# Patient Record
Sex: MALE | Race: WHITE | NOT HISPANIC OR LATINO | URBAN - METROPOLITAN AREA
[De-identification: names, ages, dates, MRNs, and addresses within clinical notes are randomized per-mention and may not be internally consistent; named-entity substitution may affect disease eponyms.]

---

## 2020-01-07 ENCOUNTER — CLINICAL SUPPORT (OUTPATIENT)
Dept: FAMILY MEDICINE CLINIC | Facility: CLINIC | Age: 45
End: 2020-01-07

## 2020-01-07 VITALS
BODY MASS INDEX: 29.54 KG/M2 | SYSTOLIC BLOOD PRESSURE: 135 MMHG | DIASTOLIC BLOOD PRESSURE: 86 MMHG | HEIGHT: 71 IN | TEMPERATURE: 98.3 F | OXYGEN SATURATION: 97 % | HEART RATE: 83 BPM | WEIGHT: 211 LBS

## 2020-01-07 DIAGNOSIS — Z02.4 ENCOUNTER FOR CDL (COMMERCIAL DRIVING LICENSE) EXAM: Primary | ICD-10-CM

## 2020-01-07 LAB
BILIRUB BLD-MCNC: NEGATIVE MG/DL
CLARITY, POC: CLEAR
COLOR UR: YELLOW
GLUCOSE UR STRIP-MCNC: NEGATIVE MG/DL
KETONES UR QL: NEGATIVE
LEUKOCYTE EST, POC: NEGATIVE
NITRITE UR-MCNC: NEGATIVE MG/ML
PH UR: 6 [PH] (ref 5–8)
PROT UR STRIP-MCNC: NEGATIVE MG/DL
RBC # UR STRIP: NEGATIVE /UL
SP GR UR: 1.03 (ref 1–1.03)
UROBILINOGEN UR QL: NORMAL

## 2020-01-07 PROCEDURE — DOTPHY: Performed by: NURSE PRACTITIONER

## 2020-01-07 PROCEDURE — 81003 URINALYSIS AUTO W/O SCOPE: CPT | Performed by: NURSE PRACTITIONER

## 2022-01-25 ENCOUNTER — CLINICAL SUPPORT (OUTPATIENT)
Dept: FAMILY MEDICINE CLINIC | Facility: CLINIC | Age: 47
End: 2022-01-25

## 2022-01-25 VITALS
TEMPERATURE: 97.1 F | WEIGHT: 207.4 LBS | BODY MASS INDEX: 29.03 KG/M2 | SYSTOLIC BLOOD PRESSURE: 132 MMHG | HEART RATE: 75 BPM | OXYGEN SATURATION: 96 % | HEIGHT: 71 IN | DIASTOLIC BLOOD PRESSURE: 84 MMHG

## 2022-01-25 DIAGNOSIS — Z02.4 ENCOUNTER FOR CDL (COMMERCIAL DRIVING LICENSE) EXAM: Primary | ICD-10-CM

## 2022-01-25 LAB
BILIRUB BLD-MCNC: NEGATIVE MG/DL
CLARITY, POC: CLEAR
COLOR UR: YELLOW
EXPIRATION DATE: NORMAL
GLUCOSE UR STRIP-MCNC: NEGATIVE MG/DL
KETONES UR QL: NEGATIVE
LEUKOCYTE EST, POC: NEGATIVE
Lab: NORMAL
NITRITE UR-MCNC: NEGATIVE MG/ML
PH UR: 6 [PH] (ref 5–8)
PROT UR STRIP-MCNC: NEGATIVE MG/DL
RBC # UR STRIP: NEGATIVE /UL
SP GR UR: 1.02 (ref 1–1.03)
UROBILINOGEN UR QL: NORMAL

## 2022-01-25 PROCEDURE — 81003 URINALYSIS AUTO W/O SCOPE: CPT | Performed by: NURSE PRACTITIONER

## 2022-01-25 PROCEDURE — DOTPHY: Performed by: NURSE PRACTITIONER

## 2022-01-25 NOTE — PROGRESS NOTES
"    Hossein Britt is a 46 y.o. male.     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  Copy the patient's appropriate forms were completed and scanned into his chart including details of this visit       The following portions of the patient's history were reviewed and updated as appropriate: allergies, current medications, past family history, past medical history, past social history, past surgical history and problem list.    Vitals:    01/25/22 1431   BP: 132/84   BP Location: Right arm   Patient Position: Sitting   Cuff Size: Large Adult   Pulse: 75   Temp: 97.1 °F (36.2 °C)   TempSrc: Temporal   SpO2: 96%   Weight: 94.1 kg (207 lb 6.4 oz)   Height: 180.3 cm (71\")     Body mass index is 28.93 kg/m².    Past Medical History:   Diagnosis Date   • Head ache    • Tobacco abuse      History reviewed. No pertinent surgical history.  Family History   Problem Relation Age of Onset   • Heart disease Father    • Hyperlipidemia Mother      Immunization History   Administered Date(s) Administered   • H1N1 Inj 12/03/2009       Clinical Support on 01/07/2020   Component Date Value Ref Range Status   • Color 01/07/2020 Yellow  Yellow, Straw, Dark Yellow, Joseline Final   • Clarity, UA 01/07/2020 Clear  Clear Final   • Specific Gravity  01/07/2020 1.030  1.005 - 1.030 Final   • pH, Urine 01/07/2020 6.0  5.0 - 8.0 Final   • Leukocytes 01/07/2020 Negative  Negative Final   • Nitrite, UA 01/07/2020 Negative  Negative Final   • Protein, POC 01/07/2020 Negative  Negative mg/dL Final   • Glucose, UA 01/07/2020 Negative  Negative, 1000 mg/dL (3+) mg/dL Final   • Ketones, UA 01/07/2020 Negative  Negative Final   • Urobilinogen, UA 01/07/2020 Normal  Normal Final   • Bilirubin 01/07/2020 Negative  Negative Final   • Blood, UA 01/07/2020 Negative  Negative Final         Review of " Systems    Objective   Physical Exam    Procedures    Assessment/Plan   Diagnoses and all orders for this visit:    1. 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           Teresa Martinez, APRN1/25/202215:11 EST  This note has been electronically signed

## 2022-02-02 ENCOUNTER — OFFICE VISIT (OUTPATIENT)
Dept: FAMILY MEDICINE CLINIC | Facility: CLINIC | Age: 47
End: 2022-02-02

## 2022-02-02 VITALS
SYSTOLIC BLOOD PRESSURE: 148 MMHG | BODY MASS INDEX: 28.81 KG/M2 | DIASTOLIC BLOOD PRESSURE: 88 MMHG | TEMPERATURE: 97.6 F | OXYGEN SATURATION: 96 % | HEART RATE: 72 BPM | HEIGHT: 71 IN | WEIGHT: 205.8 LBS

## 2022-02-02 DIAGNOSIS — R06.02 SHORTNESS OF BREATH: ICD-10-CM

## 2022-02-02 DIAGNOSIS — Z72.0 TOBACCO ABUSE: ICD-10-CM

## 2022-02-02 DIAGNOSIS — Z00.00 PREVENTATIVE HEALTH CARE: Primary | ICD-10-CM

## 2022-02-02 DIAGNOSIS — Z13.220 LIPID SCREENING: ICD-10-CM

## 2022-02-02 DIAGNOSIS — R06.83 SNORING: ICD-10-CM

## 2022-02-02 DIAGNOSIS — I10 ESSENTIAL HYPERTENSION: ICD-10-CM

## 2022-02-02 DIAGNOSIS — Z12.5 SCREENING PSA (PROSTATE SPECIFIC ANTIGEN): ICD-10-CM

## 2022-02-02 PROBLEM — R06.09 DYSPNEA ON EXERTION: Status: ACTIVE | Noted: 2022-02-02

## 2022-02-02 PROCEDURE — 99214 OFFICE O/P EST MOD 30 MIN: CPT | Performed by: NURSE PRACTITIONER

## 2022-02-02 RX ORDER — AMLODIPINE BESYLATE 2.5 MG/1
2.5 TABLET ORAL
Qty: 30 TABLET | Refills: 2 | Status: SHIPPED | OUTPATIENT
Start: 2022-02-02 | End: 2022-08-03 | Stop reason: SDUPTHER

## 2022-02-02 NOTE — PATIENT INSTRUCTIONS
Neurology referral for sleep study  Amlodipine 2.5 mg daily  Monitor blood pressure with parameters given  Chest x-ray  Fasting blood work  Stop smoking

## 2022-02-02 NOTE — PROGRESS NOTES
"    Hossein Britt is a 46 y.o. male.     46-year-old obese white male smoker with history of migraines who comes in today to be established as a new patient    Blood pressure 148/88 heart rate 72 with small systolic murmur.  He denies any chest pain, dyspnea, tachycardia or dizziness.  I am going to place him on a low-dose of amlodipine and he is to monitor blood pressures with parameters given and call the office    Patient has smoked for many many years and does complain of some dyspnea we will going to get a chest x-ray today.  We discussed smoking cessation    Patient states his wife is complaining that he snores really badly and she is concerned he has sleep apnea so patient is wanting referral to neurology    Weight is 206 with a BMI 28.7.  Patient has had one Covid shot I will do his PSA with blood work and he needs to schedule an eye exam          Neurology referral for sleep study  Amlodipine 2.5 mg daily  Monitor blood pressure with parameters given  Chest x-ray  Fasting blood work  Stop smoking         The following portions of the patient's history were reviewed and updated as appropriate: allergies, current medications, past family history, past medical history, past social history, past surgical history and problem list.    Vitals:    02/02/22 0825   BP: 148/88   BP Location: Left arm   Patient Position: Sitting   Cuff Size: Large Adult   Pulse: 72   Temp: 97.6 °F (36.4 °C)   TempSrc: Temporal   SpO2: 96%   Weight: 93.4 kg (205 lb 12.8 oz)   Height: 180.3 cm (71\")     Body mass index is 28.7 kg/m².    Past Medical History:   Diagnosis Date   • Head ache    • Tobacco abuse      History reviewed. No pertinent surgical history.  Family History   Problem Relation Age of Onset   • Heart disease Father    • Hyperlipidemia Mother      Immunization History   Administered Date(s) Administered   • COVID-19 (PFIZER) PURPLE CAP 08/23/2021   • H1N1 Inj 12/03/2009       Clinical Support on 01/25/2022   Component " Date Value Ref Range Status   • Color 01/25/2022 Yellow  Yellow, Straw, Dark Yellow, Joseline Final   • Clarity, UA 01/25/2022 Clear  Clear Final   • Specific Gravity  01/25/2022 1.020  1.005 - 1.030 Final   • pH, Urine 01/25/2022 6.0  5.0 - 8.0 Final   • Leukocytes 01/25/2022 Negative  Negative Final   • Nitrite, UA 01/25/2022 Negative  Negative Final   • Protein, POC 01/25/2022 Negative  Negative mg/dL Final   • Glucose, UA 01/25/2022 Negative  Negative, 1000 mg/dL (3+) mg/dL Final   • Ketones, UA 01/25/2022 Negative  Negative Final   • Urobilinogen, UA 01/25/2022 Normal  Normal Final   • Bilirubin 01/25/2022 Negative  Negative Final   • Blood, UA 01/25/2022 Negative  Negative Final   • Lot Number 01/25/2022 [id number]   Final   • Expiration Date 01/25/2022 7/25/23   Final         Review of Systems   Constitutional: Negative.    HENT: Negative.    Respiratory: Positive for shortness of breath.    Cardiovascular: Negative.    Gastrointestinal: Negative.    Genitourinary: Negative.    Musculoskeletal: Negative.    Skin: Negative.    Neurological: Negative.    Psychiatric/Behavioral: Negative.        Objective   Physical Exam  Constitutional:       Appearance: Normal appearance.   HENT:      Head: Normocephalic.   Cardiovascular:      Rate and Rhythm: Normal rate and regular rhythm.      Pulses: Normal pulses.      Heart sounds: Murmur heard.       Pulmonary:      Effort: Pulmonary effort is normal.      Breath sounds: Normal breath sounds.   Abdominal:      General: Bowel sounds are normal.   Musculoskeletal:         General: Normal range of motion.   Skin:     General: Skin is warm and dry.   Neurological:      General: No focal deficit present.      Mental Status: He is alert and oriented to person, place, and time.   Psychiatric:         Mood and Affect: Mood normal.         Behavior: Behavior normal.         Procedures    Assessment/Plan   Diagnoses and all orders for this visit:    1. Preventative health care  (Primary)  -     CBC & Differential  -     Comprehensive Metabolic Panel  -     Lipid Panel With LDL / HDL Ratio    2. Lipid screening  -     Lipid Panel With LDL / HDL Ratio    3. Screening PSA (prostate specific antigen)  -     PSA Screen    4. Shortness of breath  -     XR Chest 2 View    5. Snoring  -     Ambulatory Referral to Neurology    6. Tobacco abuse    7. Essential hypertension    Other orders  -     amLODIPine (NORVASC) 2.5 MG tablet; Take 1 tablet by mouth every night at bedtime.  Dispense: 30 tablet; Refill: 2          Current Outpatient Medications:   •  amLODIPine (NORVASC) 2.5 MG tablet, Take 1 tablet by mouth every night at bedtime., Disp: 30 tablet, Rfl: 2           Teresa Martinez, APRN2/2/202208:50 EST  This note has been electronically signed

## 2022-02-03 LAB
ALBUMIN SERPL-MCNC: 4.6 G/DL (ref 4–5)
ALBUMIN/GLOB SERPL: 1.9 {RATIO} (ref 1.2–2.2)
ALP SERPL-CCNC: 99 IU/L (ref 44–121)
ALT SERPL-CCNC: 27 IU/L (ref 0–44)
AST SERPL-CCNC: 25 IU/L (ref 0–40)
BASOPHILS # BLD AUTO: 0.1 X10E3/UL (ref 0–0.2)
BASOPHILS NFR BLD AUTO: 1 %
BILIRUB SERPL-MCNC: 0.3 MG/DL (ref 0–1.2)
BUN SERPL-MCNC: 19 MG/DL (ref 6–24)
BUN/CREAT SERPL: 17 (ref 9–20)
CALCIUM SERPL-MCNC: 9.6 MG/DL (ref 8.7–10.2)
CHLORIDE SERPL-SCNC: 103 MMOL/L (ref 96–106)
CHOLEST SERPL-MCNC: 216 MG/DL (ref 100–199)
CO2 SERPL-SCNC: 21 MMOL/L (ref 20–29)
CREAT SERPL-MCNC: 1.14 MG/DL (ref 0.76–1.27)
EOSINOPHIL # BLD AUTO: 0.3 X10E3/UL (ref 0–0.4)
EOSINOPHIL NFR BLD AUTO: 4 %
ERYTHROCYTE [DISTWIDTH] IN BLOOD BY AUTOMATED COUNT: 12 % (ref 11.6–15.4)
GLOBULIN SER CALC-MCNC: 2.4 G/DL (ref 1.5–4.5)
GLUCOSE SERPL-MCNC: 103 MG/DL (ref 65–99)
HCT VFR BLD AUTO: 47.1 % (ref 37.5–51)
HDLC SERPL-MCNC: 41 MG/DL
HGB BLD-MCNC: 16.6 G/DL (ref 13–17.7)
IMM GRANULOCYTES # BLD AUTO: 0 X10E3/UL (ref 0–0.1)
IMM GRANULOCYTES NFR BLD AUTO: 0 %
LDLC SERPL CALC-MCNC: 110 MG/DL (ref 0–99)
LDLC/HDLC SERPL: 2.7 RATIO (ref 0–3.6)
LYMPHOCYTES # BLD AUTO: 1.5 X10E3/UL (ref 0.7–3.1)
LYMPHOCYTES NFR BLD AUTO: 21 %
MCH RBC QN AUTO: 33.9 PG (ref 26.6–33)
MCHC RBC AUTO-ENTMCNC: 35.2 G/DL (ref 31.5–35.7)
MCV RBC AUTO: 96 FL (ref 79–97)
MONOCYTES # BLD AUTO: 0.7 X10E3/UL (ref 0.1–0.9)
MONOCYTES NFR BLD AUTO: 10 %
NEUTROPHILS # BLD AUTO: 4.7 X10E3/UL (ref 1.4–7)
NEUTROPHILS NFR BLD AUTO: 64 %
PLATELET # BLD AUTO: 268 X10E3/UL (ref 150–450)
POTASSIUM SERPL-SCNC: 4.7 MMOL/L (ref 3.5–5.2)
PROT SERPL-MCNC: 7 G/DL (ref 6–8.5)
PSA SERPL-MCNC: 1.1 NG/ML (ref 0–4)
RBC # BLD AUTO: 4.9 X10E6/UL (ref 4.14–5.8)
SODIUM SERPL-SCNC: 140 MMOL/L (ref 134–144)
TRIGL SERPL-MCNC: 377 MG/DL (ref 0–149)
VLDLC SERPL CALC-MCNC: 65 MG/DL (ref 5–40)
WBC # BLD AUTO: 7.3 X10E3/UL (ref 3.4–10.8)

## 2022-02-03 RX ORDER — PRAVASTATIN SODIUM 10 MG
10 TABLET ORAL DAILY
Qty: 30 TABLET | Refills: 2 | Status: SHIPPED | OUTPATIENT
Start: 2022-02-03 | End: 2022-08-03 | Stop reason: SDUPTHER

## 2022-08-03 ENCOUNTER — OFFICE VISIT (OUTPATIENT)
Dept: FAMILY MEDICINE CLINIC | Facility: CLINIC | Age: 47
End: 2022-08-03

## 2022-08-03 VITALS
WEIGHT: 189.6 LBS | SYSTOLIC BLOOD PRESSURE: 138 MMHG | BODY MASS INDEX: 26.54 KG/M2 | HEART RATE: 103 BPM | OXYGEN SATURATION: 95 % | HEIGHT: 71 IN | DIASTOLIC BLOOD PRESSURE: 88 MMHG | TEMPERATURE: 97.6 F

## 2022-08-03 DIAGNOSIS — R73.09 ELEVATED GLUCOSE: Primary | ICD-10-CM

## 2022-08-03 DIAGNOSIS — I10 ESSENTIAL HYPERTENSION: ICD-10-CM

## 2022-08-03 DIAGNOSIS — Z72.0 TOBACCO ABUSE: ICD-10-CM

## 2022-08-03 DIAGNOSIS — E66.3 OVERWEIGHT WITH BODY MASS INDEX (BMI) OF 26 TO 26.9 IN ADULT: ICD-10-CM

## 2022-08-03 DIAGNOSIS — E78.2 MIXED HYPERLIPIDEMIA: ICD-10-CM

## 2022-08-03 PROCEDURE — 99213 OFFICE O/P EST LOW 20 MIN: CPT | Performed by: NURSE PRACTITIONER

## 2022-08-03 RX ORDER — PRAVASTATIN SODIUM 10 MG
10 TABLET ORAL DAILY
Qty: 90 TABLET | Refills: 1 | Status: SHIPPED | OUTPATIENT
Start: 2022-08-03 | End: 2022-11-23 | Stop reason: SDUPTHER

## 2022-08-03 RX ORDER — AMLODIPINE BESYLATE 2.5 MG/1
2.5 TABLET ORAL
Qty: 90 TABLET | Refills: 1 | Status: SHIPPED | OUTPATIENT
Start: 2022-08-03 | End: 2022-11-23 | Stop reason: SDUPTHER

## 2022-08-03 NOTE — PATIENT INSTRUCTIONS
Fasting blood work  Take statin as directed  Schedule eye exam  Consider getting COVID booster  Follow-up 6 months

## 2022-08-03 NOTE — PROGRESS NOTES
"    Hossein Britt is a 46 y.o. male.     46-year-old white male smoker with history of migraines and hyperlipidemia who comes in today for follow-up visit and blood work    Blood pressure 138/88 heart rate 102 he denies any chest pain, dyspnea, tachycardia or dizziness    Will be doing fasting labs today patient admits he has not been taking the statin.  His last triglycerides were 377 cholesterol 216  however he has lost 16 pounds with a weight of 1 9 and a BMI 26.4 so I am hoping his cholesterol panel has improved on its own.  I did reorder the statin for the patient.   rest of labs were stable    Patient has had 1 COVID-vaccine and has not gotten a booster needs to schedule an eye exam and has been quite a few years since he had 1 and he is up-to-date on PSA            Fasting blood work  Take statin as directed  Schedule eye exam  Consider getting COVID booster  Follow-up 6 months       The following portions of the patient's history were reviewed and updated as appropriate: allergies, current medications, past family history, past medical history, past social history, past surgical history and problem list.    Vitals:    08/03/22 0807   BP: 138/88   BP Location: Left arm   Patient Position: Sitting   Cuff Size: Large Adult   Pulse: 103   Temp: 97.6 °F (36.4 °C)   TempSrc: Temporal   SpO2: 95%   Weight: 86 kg (189 lb 9.6 oz)   Height: 180.3 cm (71\")     Body mass index is 26.44 kg/m².    Past Medical History:   Diagnosis Date   • Head ache    • Tobacco abuse      History reviewed. No pertinent surgical history.  Family History   Problem Relation Age of Onset   • Heart disease Father    • Hyperlipidemia Mother      Immunization History   Administered Date(s) Administered   • COVID-19 (PFIZER) PURPLE CAP 08/23/2021   • H1N1 Inj 12/03/2009       Office Visit on 02/02/2022   Component Date Value Ref Range Status   • WBC 02/02/2022 7.3  3.4 - 10.8 x10E3/uL Final   • RBC 02/02/2022 4.90  4.14 - 5.80 x10E6/uL " Final   • Hemoglobin 02/02/2022 16.6  13.0 - 17.7 g/dL Final   • Hematocrit 02/02/2022 47.1  37.5 - 51.0 % Final   • MCV 02/02/2022 96  79 - 97 fL Final   • MCH 02/02/2022 33.9 (A) 26.6 - 33.0 pg Final   • MCHC 02/02/2022 35.2  31.5 - 35.7 g/dL Final   • RDW 02/02/2022 12.0  11.6 - 15.4 % Final   • Platelets 02/02/2022 268  150 - 450 x10E3/uL Final   • Neutrophil Rel % 02/02/2022 64  Not Estab. % Final   • Lymphocyte Rel % 02/02/2022 21  Not Estab. % Final   • Monocyte Rel % 02/02/2022 10  Not Estab. % Final   • Eosinophil Rel % 02/02/2022 4  Not Estab. % Final   • Basophil Rel % 02/02/2022 1  Not Estab. % Final   • Neutrophils Absolute 02/02/2022 4.7  1.4 - 7.0 x10E3/uL Final   • Lymphocytes Absolute 02/02/2022 1.5  0.7 - 3.1 x10E3/uL Final   • Monocytes Absolute 02/02/2022 0.7  0.1 - 0.9 x10E3/uL Final   • Eosinophils Absolute 02/02/2022 0.3  0.0 - 0.4 x10E3/uL Final   • Basophils Absolute 02/02/2022 0.1  0.0 - 0.2 x10E3/uL Final   • Immature Granulocyte Rel % 02/02/2022 0  Not Estab. % Final   • Immature Grans Absolute 02/02/2022 0.0  0.0 - 0.1 x10E3/uL Final   • Glucose 02/02/2022 103 (A) 65 - 99 mg/dL Final   • BUN 02/02/2022 19  6 - 24 mg/dL Final   • Creatinine 02/02/2022 1.14  0.76 - 1.27 mg/dL Final   • eGFR Non  Am 02/02/2022 78  >59 mL/min/1.73 Final   • eGFR African Am 02/02/2022 90  >59 mL/min/1.73 Final    Comment: **In accordance with recommendations from the NKF-ASN Task force,**    [facility] is in the process of updating its eGFR calculation to the    2021 CKD-EPI creatinine equation that estimates kidney function    without a race variable.     • BUN/Creatinine Ratio 02/02/2022 17  9 - 20 Final   • Sodium 02/02/2022 140  134 - 144 mmol/L Final   • Potassium 02/02/2022 4.7  3.5 - 5.2 mmol/L Final   • Chloride 02/02/2022 103  96 - 106 mmol/L Final   • Total CO2 02/02/2022 21  20 - 29 mmol/L Final   • Calcium 02/02/2022 9.6  8.7 - 10.2 mg/dL Final   • Total Protein 02/02/2022 7.0  6.0 - 8.5 g/dL  Final   • Albumin 02/02/2022 4.6  4.0 - 5.0 g/dL Final   • Globulin 02/02/2022 2.4  1.5 - 4.5 g/dL Final   • A/G Ratio 02/02/2022 1.9  1.2 - 2.2 Final   • Total Bilirubin 02/02/2022 0.3  0.0 - 1.2 mg/dL Final   • Alkaline Phosphatase 02/02/2022 99  44 - 121 IU/L Final   • AST (SGOT) 02/02/2022 25  0 - 40 IU/L Final   • ALT (SGPT) 02/02/2022 27  0 - 44 IU/L Final   • Total Cholesterol 02/02/2022 216 (A) 100 - 199 mg/dL Final   • Triglycerides 02/02/2022 377 (A) 0 - 149 mg/dL Final   • HDL Cholesterol 02/02/2022 41  >39 mg/dL Final   • VLDL Cholesterol Jeremy 02/02/2022 65 (A) 5 - 40 mg/dL Final   • LDL Chol Calc (NIH) 02/02/2022 110 (A) 0 - 99 mg/dL Final   • LDL/HDL RATIO 02/02/2022 2.7  0.0 - 3.6 ratio Final    Comment:                                     LDL/HDL Ratio                                              Men  Women                                1/2 Avg.Risk  1.0    1.5                                    Avg.Risk  3.6    3.2                                 2X Avg.Risk  6.2    5.0                                 3X Avg.Risk  8.0    6.1     • PSA 02/02/2022 1.1  0.0 - 4.0 ng/mL Final    Comment: Roche ECLIA methodology.  According to the American Urological Association, Serum PSA should  decrease and remain at undetectable levels after radical  prostatectomy. The AUA defines biochemical recurrence as an initial  PSA value 0.2 ng/mL or greater followed by a subsequent confirmatory  PSA value 0.2 ng/mL or greater.  Values obtained with different assay methods or kits cannot be used  interchangeably. Results cannot be interpreted as absolute evidence  of the presence or absence of malignant disease.           Review of Systems   Constitutional: Negative.    HENT: Negative.    Respiratory: Negative.    Cardiovascular: Negative.    Gastrointestinal: Negative.    Genitourinary: Negative.    Musculoskeletal: Negative.    Skin: Negative.    Neurological: Negative.    Psychiatric/Behavioral: Negative.        Objective    Physical Exam  Constitutional:       Appearance: Normal appearance.   HENT:      Head: Normocephalic.   Cardiovascular:      Rate and Rhythm: Normal rate and regular rhythm.      Pulses: Normal pulses.      Heart sounds: Normal heart sounds.   Pulmonary:      Effort: Pulmonary effort is normal.      Breath sounds: Normal breath sounds.   Abdominal:      General: Bowel sounds are normal.   Musculoskeletal:         General: Normal range of motion.   Skin:     General: Skin is warm and dry.   Neurological:      General: No focal deficit present.      Mental Status: He is alert and oriented to person, place, and time.   Psychiatric:         Mood and Affect: Mood normal.         Behavior: Behavior normal.         Procedures    Assessment & Plan   Diagnoses and all orders for this visit:    1. Elevated glucose (Primary)  -     Comprehensive Metabolic Panel  -     Hemoglobin A1c    2. Mixed hyperlipidemia  -     Lipid Panel With LDL / HDL Ratio    3. Essential hypertension    4. Tobacco abuse    5. Overweight with body mass index (BMI) of 26 to 26.9 in adult    Other orders  -     amLODIPine (NORVASC) 2.5 MG tablet; Take 1 tablet by mouth every night at bedtime.  Dispense: 90 tablet; Refill: 1  -     pravastatin (PRAVACHOL) 10 MG tablet; Take 1 tablet by mouth Daily.  Dispense: 90 tablet; Refill: 1          Current Outpatient Medications:   •  amLODIPine (NORVASC) 2.5 MG tablet, Take 1 tablet by mouth every night at bedtime., Disp: 90 tablet, Rfl: 1  •  pravastatin (PRAVACHOL) 10 MG tablet, Take 1 tablet by mouth Daily., Disp: 90 tablet, Rfl: 1           ECTOR Rueda 8/3/2022 08:51 EDT  This note has been electronically signed

## 2022-08-04 LAB
ALBUMIN SERPL-MCNC: 4.2 G/DL (ref 4–5)
ALBUMIN/GLOB SERPL: 1.1 {RATIO} (ref 1.2–2.2)
ALP SERPL-CCNC: 161 IU/L (ref 44–121)
ALT SERPL-CCNC: 18 IU/L (ref 0–44)
AST SERPL-CCNC: 19 IU/L (ref 0–40)
BILIRUB SERPL-MCNC: 0.3 MG/DL (ref 0–1.2)
BUN SERPL-MCNC: 12 MG/DL (ref 6–24)
BUN/CREAT SERPL: 14 (ref 9–20)
CALCIUM SERPL-MCNC: 9.4 MG/DL (ref 8.7–10.2)
CHLORIDE SERPL-SCNC: 99 MMOL/L (ref 96–106)
CHOLEST SERPL-MCNC: 207 MG/DL (ref 100–199)
CO2 SERPL-SCNC: 23 MMOL/L (ref 20–29)
CREAT SERPL-MCNC: 0.88 MG/DL (ref 0.76–1.27)
EGFRCR SERPLBLD CKD-EPI 2021: 109 ML/MIN/1.73
GLOBULIN SER CALC-MCNC: 3.7 G/DL (ref 1.5–4.5)
GLUCOSE SERPL-MCNC: 91 MG/DL (ref 65–99)
HBA1C MFR BLD: 5.9 % (ref 4.8–5.6)
HDLC SERPL-MCNC: 39 MG/DL
LDLC SERPL CALC-MCNC: 145 MG/DL (ref 0–99)
LDLC/HDLC SERPL: 3.7 RATIO (ref 0–3.6)
POTASSIUM SERPL-SCNC: 4.5 MMOL/L (ref 3.5–5.2)
PROT SERPL-MCNC: 7.9 G/DL (ref 6–8.5)
SODIUM SERPL-SCNC: 141 MMOL/L (ref 134–144)
TRIGL SERPL-MCNC: 127 MG/DL (ref 0–149)
VLDLC SERPL CALC-MCNC: 23 MG/DL (ref 5–40)

## 2022-11-09 ENCOUNTER — TELEPHONE (OUTPATIENT)
Dept: FAMILY MEDICINE CLINIC | Facility: CLINIC | Age: 47
End: 2022-11-09

## 2022-11-23 RX ORDER — PRAVASTATIN SODIUM 10 MG
10 TABLET ORAL DAILY
Qty: 90 TABLET | Refills: 1 | Status: SHIPPED | OUTPATIENT
Start: 2022-11-23 | End: 2023-03-22 | Stop reason: SDUPTHER

## 2022-11-23 RX ORDER — AMLODIPINE BESYLATE 2.5 MG/1
2.5 TABLET ORAL
Qty: 90 TABLET | Refills: 1 | Status: SHIPPED | OUTPATIENT
Start: 2022-11-23 | End: 2023-03-22 | Stop reason: SDUPTHER

## 2023-03-22 RX ORDER — AMLODIPINE BESYLATE 2.5 MG/1
2.5 TABLET ORAL
Qty: 90 TABLET | Refills: 1 | Status: SHIPPED | OUTPATIENT
Start: 2023-03-22

## 2023-03-22 RX ORDER — PRAVASTATIN SODIUM 10 MG
10 TABLET ORAL DAILY
Qty: 90 TABLET | Refills: 1 | Status: SHIPPED | OUTPATIENT
Start: 2023-03-22

## 2024-01-31 ENCOUNTER — TELEPHONE (OUTPATIENT)
Dept: FAMILY MEDICINE CLINIC | Facility: CLINIC | Age: 49
End: 2024-01-31
Payer: COMMERCIAL

## 2024-02-05 ENCOUNTER — CLINICAL SUPPORT (OUTPATIENT)
Dept: FAMILY MEDICINE CLINIC | Facility: CLINIC | Age: 49
End: 2024-02-05

## 2024-02-05 VITALS
DIASTOLIC BLOOD PRESSURE: 80 MMHG | SYSTOLIC BLOOD PRESSURE: 115 MMHG | HEART RATE: 73 BPM | WEIGHT: 171.4 LBS | TEMPERATURE: 97.8 F | OXYGEN SATURATION: 99 % | HEIGHT: 71 IN | BODY MASS INDEX: 24 KG/M2

## 2024-02-05 DIAGNOSIS — E78.2 MIXED HYPERLIPIDEMIA: ICD-10-CM

## 2024-02-05 DIAGNOSIS — Z02.4 ENCOUNTER FOR CDL (COMMERCIAL DRIVING LICENSE) EXAM: Primary | ICD-10-CM

## 2024-02-05 DIAGNOSIS — R73.09 ELEVATED HEMOGLOBIN A1C: ICD-10-CM

## 2024-02-05 DIAGNOSIS — Z00.00 PREVENTATIVE HEALTH CARE: ICD-10-CM

## 2024-02-05 DIAGNOSIS — Z12.5 SCREENING PSA (PROSTATE SPECIFIC ANTIGEN): ICD-10-CM

## 2024-02-05 DIAGNOSIS — Z72.0 TOBACCO ABUSE: ICD-10-CM

## 2024-02-05 LAB
BILIRUB BLD-MCNC: NEGATIVE MG/DL
CLARITY, POC: CLEAR
COLOR UR: YELLOW
EXPIRATION DATE: NORMAL
GLUCOSE UR STRIP-MCNC: NEGATIVE MG/DL
KETONES UR QL: NEGATIVE
LEUKOCYTE EST, POC: NEGATIVE
Lab: NORMAL
NITRITE UR-MCNC: NEGATIVE MG/ML
PH UR: 6 [PH] (ref 5–8)
PROT UR STRIP-MCNC: NEGATIVE MG/DL
RBC # UR STRIP: NEGATIVE /UL
SP GR UR: 1.01 (ref 1–1.03)
UROBILINOGEN UR QL: NORMAL

## 2024-02-05 PROCEDURE — 81003 URINALYSIS AUTO W/O SCOPE: CPT | Performed by: NURSE PRACTITIONER

## 2024-02-05 PROCEDURE — 99396 PREV VISIT EST AGE 40-64: CPT | Performed by: NURSE PRACTITIONER

## 2024-02-05 NOTE — PROGRESS NOTES
"    Hossein Britt is a 48 y.o. male.     History of Present Illness  48-year-old white male smoker with history of migraines and hyperlipidemia who comes in today for annual visit and fasting blood work    Blood pressure 114/80 heart rate 72 he denies any chest pain, dyspnea, tachycardia or dizziness    Patient has been dieting and has lost 18 pounds with a weight of 171 BMI of 23.9.  He has had 1 COVID vaccines up-to-date on eye exam we will do a PSA with blood work          Fasting blood work  May consider current COVID vaccine  Try to maintain weight  Wear sunscreen when outside/seatbelt while driving/practice safe sex  Follow-up 6 months to a year       The following portions of the patient's history were reviewed and updated as appropriate: allergies, current medications, past family history, past medical history, past social history, past surgical history, and problem list.    Vitals:    02/05/24 1518   BP: 115/80   BP Location: Left arm   Patient Position: Sitting   Cuff Size: Adult   Pulse: 73   Temp: 97.8 °F (36.6 °C)   TempSrc: Temporal   SpO2: 99%   Weight: 77.7 kg (171 lb 6.4 oz)   Height: 180.3 cm (71\")     Body mass index is 23.91 kg/m².    Past Medical History:   Diagnosis Date    Head ache     Tobacco abuse      History reviewed. No pertinent surgical history.  Family History   Problem Relation Age of Onset    Heart disease Father     Hyperlipidemia Mother      Immunization History   Administered Date(s) Administered    COVID-19 (PFIZER) Purple Cap Monovalent 08/23/2021    H1N1 Inj 12/03/2009       Office Visit on 08/03/2022   Component Date Value Ref Range Status    Glucose 08/03/2022 91  65 - 99 mg/dL Final    BUN 08/03/2022 12  6 - 24 mg/dL Final    Creatinine 08/03/2022 0.88  0.76 - 1.27 mg/dL Final    EGFR Result 08/03/2022 109  >59 mL/min/1.73 Final    BUN/Creatinine Ratio 08/03/2022 14  9 - 20 Final    Sodium 08/03/2022 141  134 - 144 mmol/L Final    Potassium 08/03/2022 4.5  3.5 - 5.2 mmol/L " Final    Chloride 08/03/2022 99  96 - 106 mmol/L Final    Total CO2 08/03/2022 23  20 - 29 mmol/L Final    Calcium 08/03/2022 9.4  8.7 - 10.2 mg/dL Final    Total Protein 08/03/2022 7.9  6.0 - 8.5 g/dL Final    Albumin 08/03/2022 4.2  4.0 - 5.0 g/dL Final    Globulin 08/03/2022 3.7  1.5 - 4.5 g/dL Final    A/G Ratio 08/03/2022 1.1 (L)  1.2 - 2.2 Final    Total Bilirubin 08/03/2022 0.3  0.0 - 1.2 mg/dL Final    Alkaline Phosphatase 08/03/2022 161 (H)  44 - 121 IU/L Final    AST (SGOT) 08/03/2022 19  0 - 40 IU/L Final    ALT (SGPT) 08/03/2022 18  0 - 44 IU/L Final    Hemoglobin A1C 08/03/2022 5.9 (H)  4.8 - 5.6 % Final    Comment:          Prediabetes: 5.7 - 6.4           Diabetes: >6.4           Glycemic control for adults with diabetes: <7.0      Total Cholesterol 08/03/2022 207 (H)  100 - 199 mg/dL Final    Triglycerides 08/03/2022 127  0 - 149 mg/dL Final    HDL Cholesterol 08/03/2022 39 (L)  >39 mg/dL Final    VLDL Cholesterol Jeremy 08/03/2022 23  5 - 40 mg/dL Final    LDL Chol Calc (NIH) 08/03/2022 145 (H)  0 - 99 mg/dL Final    LDL/HDL RATIO 08/03/2022 3.7 (H)  0.0 - 3.6 ratio Final    Comment:                                     LDL/HDL Ratio                                              Men  Women                                1/2 Avg.Risk  1.0    1.5                                    Avg.Risk  3.6    3.2                                 2X Avg.Risk  6.2    5.0                                 3X Avg.Risk  8.0    6.1           Review of Systems   Constitutional: Negative.    HENT: Negative.     Respiratory: Negative.     Cardiovascular: Negative.    Gastrointestinal: Negative.    Genitourinary: Negative.    Musculoskeletal: Negative.    Skin: Negative.    Neurological: Negative.    Psychiatric/Behavioral: Negative.         Objective   Physical Exam  Constitutional:       Appearance: Normal appearance.   HENT:      Head: Normocephalic.   Cardiovascular:      Rate and Rhythm: Normal rate and regular rhythm.       Pulses: Normal pulses.      Heart sounds: Normal heart sounds.   Pulmonary:      Effort: Pulmonary effort is normal.      Breath sounds: Normal breath sounds.   Abdominal:      General: Bowel sounds are normal.   Musculoskeletal:         General: Normal range of motion.   Skin:     General: Skin is warm.   Neurological:      General: No focal deficit present.      Mental Status: He is alert and oriented to person, place, and time.   Psychiatric:         Mood and Affect: Mood normal.         Behavior: Behavior normal.         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 Mixed hyperlipidemia  -     Cancel: Lipid Panel With LDL / HDL Ratio; Future  -     Lipid Panel With LDL / HDL Ratio    3. Elevated hemoglobin A1c  -     Cancel: Comprehensive Metabolic Panel; Future  -     Cancel: Hemoglobin A1c; Future  -     Comprehensive Metabolic Panel  -     Hemoglobin A1c    4. Preventative health care  -     Cancel: CBC & Differential; Future  -     CBC & Differential    5. Screening PSA (prostate specific antigen)  -     Cancel: PSA Screen; Future  -     PSA Screen    6. Tobacco abuse    7. BMI 23.0-23.9, adult         No current outpatient medications on file.           Teresa Martinez, APRN 2/5/2024 16:27 EST  This note has been electronically signed

## 2024-02-06 LAB
ALBUMIN SERPL-MCNC: 4.5 G/DL (ref 4.1–5.1)
ALBUMIN/GLOB SERPL: 2 {RATIO} (ref 1.2–2.2)
ALP SERPL-CCNC: 82 IU/L (ref 44–121)
ALT SERPL-CCNC: 18 IU/L (ref 0–44)
AST SERPL-CCNC: 20 IU/L (ref 0–40)
BASOPHILS # BLD AUTO: 0.1 X10E3/UL (ref 0–0.2)
BASOPHILS NFR BLD AUTO: 1 %
BILIRUB SERPL-MCNC: <0.2 MG/DL (ref 0–1.2)
BUN SERPL-MCNC: 19 MG/DL (ref 6–24)
BUN/CREAT SERPL: 19 (ref 9–20)
CALCIUM SERPL-MCNC: 9.4 MG/DL (ref 8.7–10.2)
CHLORIDE SERPL-SCNC: 103 MMOL/L (ref 96–106)
CHOLEST SERPL-MCNC: 191 MG/DL (ref 100–199)
CO2 SERPL-SCNC: 23 MMOL/L (ref 20–29)
CREAT SERPL-MCNC: 1.01 MG/DL (ref 0.76–1.27)
EGFRCR SERPLBLD CKD-EPI 2021: 93 ML/MIN/1.73
EOSINOPHIL # BLD AUTO: 0.5 X10E3/UL (ref 0–0.4)
EOSINOPHIL NFR BLD AUTO: 6 %
ERYTHROCYTE [DISTWIDTH] IN BLOOD BY AUTOMATED COUNT: 11.9 % (ref 11.6–15.4)
GLOBULIN SER CALC-MCNC: 2.2 G/DL (ref 1.5–4.5)
GLUCOSE SERPL-MCNC: 90 MG/DL (ref 70–99)
HBA1C MFR BLD: 5.8 % (ref 4.8–5.6)
HCT VFR BLD AUTO: 45.9 % (ref 37.5–51)
HDLC SERPL-MCNC: 43 MG/DL
HGB BLD-MCNC: 16 G/DL (ref 13–17.7)
IMM GRANULOCYTES # BLD AUTO: 0 X10E3/UL (ref 0–0.1)
IMM GRANULOCYTES NFR BLD AUTO: 0 %
LDLC SERPL CALC-MCNC: 133 MG/DL (ref 0–99)
LDLC/HDLC SERPL: 3.1 RATIO (ref 0–3.6)
LYMPHOCYTES # BLD AUTO: 2.4 X10E3/UL (ref 0.7–3.1)
LYMPHOCYTES NFR BLD AUTO: 27 %
MCH RBC QN AUTO: 33.4 PG (ref 26.6–33)
MCHC RBC AUTO-ENTMCNC: 34.9 G/DL (ref 31.5–35.7)
MCV RBC AUTO: 96 FL (ref 79–97)
MONOCYTES # BLD AUTO: 1.1 X10E3/UL (ref 0.1–0.9)
MONOCYTES NFR BLD AUTO: 12 %
NEUTROPHILS # BLD AUTO: 4.8 X10E3/UL (ref 1.4–7)
NEUTROPHILS NFR BLD AUTO: 54 %
PLATELET # BLD AUTO: 238 X10E3/UL (ref 150–450)
POTASSIUM SERPL-SCNC: 5 MMOL/L (ref 3.5–5.2)
PROT SERPL-MCNC: 6.7 G/DL (ref 6–8.5)
PSA SERPL-MCNC: 1.5 NG/ML (ref 0–4)
RBC # BLD AUTO: 4.79 X10E6/UL (ref 4.14–5.8)
SODIUM SERPL-SCNC: 140 MMOL/L (ref 134–144)
TRIGL SERPL-MCNC: 81 MG/DL (ref 0–149)
VLDLC SERPL CALC-MCNC: 15 MG/DL (ref 5–40)
WBC # BLD AUTO: 8.9 X10E3/UL (ref 3.4–10.8)

## 2024-02-14 ENCOUNTER — CLINICAL SUPPORT (OUTPATIENT)
Dept: FAMILY MEDICINE CLINIC | Facility: CLINIC | Age: 49
End: 2024-02-14

## 2024-02-14 VITALS
BODY MASS INDEX: 24.22 KG/M2 | DIASTOLIC BLOOD PRESSURE: 79 MMHG | WEIGHT: 173 LBS | OXYGEN SATURATION: 98 % | HEART RATE: 63 BPM | HEIGHT: 71 IN | SYSTOLIC BLOOD PRESSURE: 124 MMHG | TEMPERATURE: 97.7 F

## 2024-02-14 DIAGNOSIS — Z02.89 ENCOUNTER FOR EXAMINATION REQUIRED BY DEPARTMENT OF TRANSPORTATION (DOT): Primary | ICD-10-CM
